# Patient Record
Sex: FEMALE | Race: WHITE
[De-identification: names, ages, dates, MRNs, and addresses within clinical notes are randomized per-mention and may not be internally consistent; named-entity substitution may affect disease eponyms.]

---

## 2020-12-12 ENCOUNTER — HOSPITAL ENCOUNTER (EMERGENCY)
Dept: HOSPITAL 54 - ER | Age: 50
Discharge: HOME | End: 2020-12-12
Payer: COMMERCIAL

## 2020-12-12 VITALS
SYSTOLIC BLOOD PRESSURE: 155 MMHG | BODY MASS INDEX: 28.22 KG/M2 | WEIGHT: 140 LBS | DIASTOLIC BLOOD PRESSURE: 115 MMHG | HEIGHT: 59 IN

## 2020-12-12 DIAGNOSIS — Z98.890: ICD-10-CM

## 2020-12-12 DIAGNOSIS — Z79.899: ICD-10-CM

## 2020-12-12 DIAGNOSIS — Z90.49: ICD-10-CM

## 2020-12-12 DIAGNOSIS — R10.30: ICD-10-CM

## 2020-12-12 DIAGNOSIS — Z90.89: ICD-10-CM

## 2020-12-12 DIAGNOSIS — Z88.5: ICD-10-CM

## 2020-12-12 DIAGNOSIS — Z88.6: ICD-10-CM

## 2020-12-12 DIAGNOSIS — Z88.0: ICD-10-CM

## 2020-12-12 DIAGNOSIS — R10.2: Primary | ICD-10-CM

## 2020-12-12 NOTE — NUR
Patient discharged to home in stable condition. Written and verbal after care 
instructions given. Patient verbalizes understanding of instruction. pt. 
ambulatory with a steady gait